# Patient Record
(demographics unavailable — no encounter records)

---

## 2024-10-23 NOTE — ASSESSMENT
[FreeTextEntry1] : Assessment: TOBIN Obesity EDS controlled   PLAN: The patient is benefitting from the PAP device. New supplies will be ordered when required. Weight loss maintenance discussed. I stressed the need maintain compliance with the PAP device. The patient is not to use an Ozone or UV sterilizer. The patient was educated in the absolute requirement to use the PAP device nightly  F/U in 6months

## 2024-10-23 NOTE — HISTORY OF PRESENT ILLNESS
[TextBox_4] : - Summary : The patient is here for a follow-up visit to discuss their experience with the continuous positive airway pressure (CPAP) machine for the treatment of sleep apnea. - Chief Complaint (CC) : Follow-up for sleep apnea treatment with CPAP machine. - History of Present Illness : The patient, who is claustrophobic, initially had difficulty adjusting to the CPAP mask during the sleep study. However, they were provided with a nasal mask to try at home. The patient reports some discomfort and nervousness during the initial nights of using the CPAP machine at home. Additionally, they mention the challenge of wearing glasses with the nasal mask, as the plastic component of the mask interferes with the glasses. The patient wants to explore alternative mask options and ensure proper usage of the CPAP machine.

## 2025-04-30 NOTE — ASSESSMENT
[FreeTextEntry1] : Assessment: TOBIN Obesity EDS controlled   PLAN: The patient is using and benefitting from the PAP device. There is good compliance with the CPAP. New supplies will be ordered today. Weight loss maintenance discussed. I stressed the need maintain compliance with the PAP device. The patient is not to use an Ozone or UV sterilizer. The patient was educated in the absolute requirement to use the PAP device nightly   F/U in 12 months